# Patient Record
Sex: FEMALE | Race: WHITE | NOT HISPANIC OR LATINO | Employment: UNEMPLOYED | URBAN - NONMETROPOLITAN AREA
[De-identification: names, ages, dates, MRNs, and addresses within clinical notes are randomized per-mention and may not be internally consistent; named-entity substitution may affect disease eponyms.]

---

## 2023-12-14 ENCOUNTER — OFFICE VISIT (OUTPATIENT)
Dept: URGENT CARE | Facility: CLINIC | Age: 28
End: 2023-12-14
Payer: COMMERCIAL

## 2023-12-14 VITALS
RESPIRATION RATE: 19 BRPM | HEART RATE: 146 BPM | OXYGEN SATURATION: 97 % | SYSTOLIC BLOOD PRESSURE: 126 MMHG | DIASTOLIC BLOOD PRESSURE: 64 MMHG | TEMPERATURE: 98.6 F

## 2023-12-14 DIAGNOSIS — Z3A.26 26 WEEKS GESTATION OF PREGNANCY: ICD-10-CM

## 2023-12-14 DIAGNOSIS — R05.1 ACUTE COUGH: Primary | ICD-10-CM

## 2023-12-14 DIAGNOSIS — R09.81 NASAL CONGESTION: ICD-10-CM

## 2023-12-14 PROCEDURE — 99203 OFFICE O/P NEW LOW 30 MIN: CPT

## 2023-12-14 PROCEDURE — 87636 SARSCOV2 & INF A&B AMP PRB: CPT

## 2023-12-14 RX ORDER — RIBOFLAVIN (VITAMIN B2) 400 MG
400 TABLET ORAL DAILY
COMMUNITY
Start: 2023-10-24

## 2023-12-14 RX ORDER — FAMOTIDINE 20 MG/1
20 TABLET, FILM COATED ORAL 2 TIMES DAILY
COMMUNITY
Start: 2023-10-12 | End: 2024-10-11

## 2023-12-14 RX ORDER — LANOLIN ALCOHOL/MO/W.PET/CERES
400 CREAM (GRAM) TOPICAL DAILY
COMMUNITY
Start: 2023-10-24 | End: 2024-10-23

## 2023-12-14 RX ORDER — METFORMIN HYDROCHLORIDE 500 MG/1
2000 TABLET, EXTENDED RELEASE ORAL
COMMUNITY
Start: 2023-03-08 | End: 2024-03-07

## 2023-12-14 RX ORDER — LEVOTHYROXINE SODIUM 0.15 MG/1
150 TABLET ORAL DAILY
COMMUNITY

## 2023-12-15 LAB
FLUAV RNA RESP QL NAA+PROBE: NEGATIVE
FLUBV RNA RESP QL NAA+PROBE: NEGATIVE
SARS-COV-2 RNA RESP QL NAA+PROBE: NEGATIVE

## 2023-12-15 NOTE — PROGRESS NOTES
[facility]        NAME: Sid Harding is a 33 y.o. female  : 1995    MRN: [id number]  DATE: 2023  TIME: 8:46 AM    Assessment and Plan   Acute cough [R05.1]  1. Acute cough  Covid/Flu-Office Collect      2. Nasal congestion        3. 26 weeks gestation of pregnancy          26 weeks pregnant-having congestion, cough. PT is is unsure of over-the-counter medications to take during pregnancy. Given instructions on at home remedies and medications. Advised to follow-up with family doctor if no improvement. Advised to go to the ER if any symptoms worsen. COVID/flu PCR swab sent for testing. Patient Instructions     Tylenol over-the-counter for pain or fever. Avoid ibuprofen in pregnancy. Nasal saline rinses and Flonase over-the-counter twice daily for congestion. Avoid any decongestants over-the-counter. May take over-the-counter Benadryl or Zyrtec for congestion. Follow-up with family doctor/OB/GYN if no improvement. Go to the ER if any symptoms worsen. COVID/flu swab sent for PCR testing, may take 2 to 3 days for results. Follow up with PCP in 3-5 days. Proceed to  ER if symptoms worsen. Chief Complaint     Chief Complaint   Patient presents with    Nasal Congestion    Cough     Started today  No OTC medication  26 weeks pregnant           History of Present Illness       50-year-old female who is currently 26 weeks pregnant presents to the clinic for cough, nasal congestion. Son has been sick with similar symptoms at home. Denies any over-the-counter medication and denies any COVID/flu testing. She denies any fever, earache, sore throat, chest pain, shortness of breath, nausea, vomiting, diarrhea, abdominal pain, vaginal bleeding. Review of Systems   Review of Systems   Constitutional:  Negative for chills and fever. HENT:  Positive for congestion and rhinorrhea. Negative for ear discharge, ear pain and sore throat. Eyes: Negative. The ECG revealed sinus bradycardia.  Respiratory:  Positive for cough. Negative for shortness of breath and wheezing. Cardiovascular: Negative. Gastrointestinal: Negative. Genitourinary: Negative. Musculoskeletal: Negative. Skin: Negative. Neurological: Negative. Current Medications       Current Outpatient Medications:     Aspirin 81 MG CAPS, Take 162 mg by mouth daily, Disp: , Rfl:     famotidine (PEPCID) 20 mg tablet, Take 20 mg by mouth 2 (two) times a day, Disp: , Rfl:     Ferrous Sulfate (IRON PO), Take 1 tablet by mouth daily, Disp: , Rfl:     levothyroxine 150 mcg tablet, Take 150 mcg by mouth daily, Disp: , Rfl:     magnesium Oxide (MagOx 400) 400 mg TABS, Take 400 mg by mouth daily, Disp: , Rfl:     metFORMIN (GLUCOPHAGE-XR) 500 mg 24 hr tablet, Take 2,000 mg by mouth, Disp: , Rfl:     Riboflavin 400 MG TABS, Take 400 mg by mouth daily, Disp: , Rfl:     sertraline (ZOLOFT) 50 mg tablet, Take 50 mg by mouth daily, Disp: , Rfl:     Current Allergies     Allergies as of 2023 - Reviewed 2023   Allergen Reaction Noted    Propofol Swelling 2017    Penicillins Rash 2017            The following portions of the patient's history were reviewed and updated as appropriate: allergies, current medications, past family history, past medical history, past social history, past surgical history and problem list.     Past Medical History:   Diagnosis Date    Depression     GERD (gastroesophageal reflux disease)     Hypothyroidism     PCOS (polycystic ovarian syndrome)        Past Surgical History:   Procedure Laterality Date    ADENOIDECTOMY       SECTION      CHOLECYSTECTOMY      TONSILLECTOMY         No family history on file. Medications have been verified. Objective   /64   Pulse (!) 146   Temp 98.6 °F (37 °C)   Resp 19   SpO2 97%        Physical Exam     Physical Exam  Constitutional:       General: She is not in acute distress. Appearance: Normal appearance.  She is not ill-appearing or diaphoretic. HENT:      Head: Normocephalic and atraumatic. Right Ear: Tympanic membrane, ear canal and external ear normal.      Left Ear: Tympanic membrane, ear canal and external ear normal.      Nose: Congestion and rhinorrhea present. Mouth/Throat:      Mouth: Mucous membranes are moist.      Pharynx: Oropharynx is clear. No oropharyngeal exudate or posterior oropharyngeal erythema. Eyes:      Extraocular Movements: Extraocular movements intact. Conjunctiva/sclera: Conjunctivae normal.      Pupils: Pupils are equal, round, and reactive to light. Cardiovascular:      Rate and Rhythm: Normal rate and regular rhythm. Pulses: Normal pulses. Heart sounds: Normal heart sounds. Pulmonary:      Effort: Pulmonary effort is normal. No respiratory distress. Breath sounds: Normal breath sounds. Abdominal:      Tenderness: There is no abdominal tenderness. There is no guarding or rebound. Lymphadenopathy:      Cervical: No cervical adenopathy. Skin:     General: Skin is warm and dry. Capillary Refill: Capillary refill takes less than 2 seconds. Findings: No rash. Neurological:      General: No focal deficit present. Mental Status: She is alert and oriented to person, place, and time. Mental status is at baseline.    Psychiatric:         Mood and Affect: Mood normal.         Behavior: Behavior normal.

## 2023-12-15 NOTE — PATIENT INSTRUCTIONS
Tylenol over-the-counter for pain or fever. Avoid ibuprofen in pregnancy. Nasal saline rinses and Flonase over-the-counter twice daily for congestion. Avoid any decongestants over-the-counter. May take over-the-counter Benadryl or Zyrtec for congestion. Follow-up with family doctor/OB/GYN if no improvement. Go to the ER if any symptoms worsen. COVID/flu swab sent for PCR testing, may take 2 to 3 days for results. Follow up with PCP in 3-5 days. Proceed to  ER if symptoms worsen. Pregnancy   WHAT YOU NEED TO KNOW:   A normal pregnancy lasts about 40 weeks. The first trimester lasts from your last period through the 12th week of pregnancy. The second trimester lasts from the 13th week through the 23rd week. The third trimester lasts from the 24th week until your baby is born. If you know the date of your last period, your healthcare provider can estimate your due date. You may give birth to your baby any time from 37 weeks to 2 weeks after your due date. DISCHARGE INSTRUCTIONS:   Return to the emergency department if:   You develop a severe headache that does not go away. You have new or increased vision changes, such as blurred or spotted vision. You have new or increased swelling in your face or hands. You have pain or cramping in your abdomen or low back. You have vaginal bleeding. Call your doctor or obstetrician if:   You have abdominal cramps, pressure, or tightening. You have a change in vaginal discharge. You cannot keep food or drinks down, and you are losing weight. You have chills or a fever. You have vaginal itching, burning, or pain. You have yellow, green, white, or foul-smelling vaginal discharge. You have pain or burning when you urinate, less urine than usual, or pink or bloody urine. You have questions or concerns about your condition or care. Medicines:   Prenatal vitamins  provide some of the extra vitamins and minerals you need during pregnancy. Prenatal vitamins may also help to decrease the risk for certain birth defects. Take your medicine as directed. Contact your healthcare provider if you think your medicine is not helping or if you have side effects. Tell your provider if you are allergic to any medicine. Keep a list of the medicines, vitamins, and herbs you take. Include the amounts, and when and why you take them. Bring the list or the pill bottles to follow-up visits. Carry your medicine list with you in case of an emergency. Prenatal care:  Prenatal care is a series of visits with your healthcare provider throughout your pregnancy. Prenatal care can help prevent problems during pregnancy and childbirth. At each prenatal visit, your provider will weigh you and check your blood pressure. He or she will also check your baby's heartbeat and growth. You may also need the following at some visits:  A pelvic exam  allows your healthcare provider to see your cervix (the bottom part of your uterus). Your healthcare provider will use a speculum to open your vagina. He or she will check the size and shape of your uterus. At your first prenatal visit, you may also have a Pap smear. This is a test to check your cervix for abnormal cells. Blood tests  may be done to check for any of the following:     Gestational diabetes or anemia (low iron level)    Blood type or Rh factor, or certain birth defects    Immunity to certain diseases, such as chickenpox or rubella    An infection, such as a sexually transmitted infection, HIV, or hepatitis B    Hepatitis B  may need to be prevented or treated. Hepatitis B is inflammation of the liver caused by the hepatitis B virus (HBV). HBV can spread from a mother to her baby during delivery. You will be checked for HBV as early as possible in the first trimester of each pregnancy. You need the test even if you received the hepatitis B vaccine or were tested before.  You may need to have an HBV infection treated before you give birth. Urine tests  may also be done to check for sugar and protein. These can be signs of gestational diabetes or preeclampsia. Urine tests may also be done to check for signs of infection. A gestational diabetes screen  may be done. Your healthcare provider may order either a 1-step or 2-step oral glucose tolerance test (OGTT). 1-step OGTT:  Your blood sugar level will be tested after you have not eaten for 8 hours (fasting). You will then be given a glucose drink. Your level will be tested again 1 hour and 2 hours after you finish the drink. 2-step OGTT:  You do not have to fast for the first part of the test. You will have the glucose drink at any time of day. Your blood sugar level will be checked 1 hour later. If your blood sugar is higher than a certain level, another test will be ordered. You will fast and your blood sugar level will be tested. You will have the glucose drink. Your blood will be tested again 1 hour, 2 hours, and 3 hours after you finish the glucose drink. A fetal ultrasound  shows pictures of your baby inside your uterus. The pictures are used to check your baby's development, movement, and position. Genetic disorder screening tests  may be offered to you. These tests check your baby's risk for genetic disorders such as Down syndrome. A screening test may include blood tests and an ultrasound. Blood tests may be used to check your DNA or your partner's DNA. Genetic tests are not always accurate or complete. Your baby may be born with a genetic disorder that did not show up in the tests. Talk to your healthcare provider about any concerns you have with genetic testing. Body changes that may occur during your pregnancy:   Breast changes  you will experience include tenderness and tingling during the early part of your pregnancy. Your breasts will become larger. You may need to use a support bra.  You may see a thin, yellow fluid, called colostrum, leak from your nipples during the second trimester. Colostrum is a liquid that changes to milk about 3 days after you give birth. Skin changes and stretch marks  may occur during your pregnancy. You may have red marks, called stretch marks, on your skin. Stretch marks will usually fade after pregnancy. Use lotion if your skin is dry and itchy. The skin on your face, around your nipples, and below your belly button may darken. Most of the time, your skin will return to its normal color after your baby is born. Morning sickness  is nausea and vomiting that can happen at any time of day. Avoid fatty and spicy foods. Eat small meals throughout the day instead of large meals. Angelica may help to decrease nausea. Ask your healthcare provider about other ways of decreasing nausea and vomiting. Heartburn  may be caused by changes in your hormones during pregnancy. Your growing uterus may also push your stomach upward and force stomach acid to back up into your esophagus. Eat 4 or 5 small meals each day instead of large meals. Avoid spicy foods. Avoid eating right before bedtime. Constipation  may develop during your pregnancy. To treat constipation, eat foods high in fiber such as fiber cereals, beans, fruits, vegetables, whole-grain breads, and prune juice. Get regular exercise and drink plenty of water. Your healthcare provider may also suggest a fiber supplement to soften your bowel movements. Talk to your healthcare provider before you use any medicines to decrease constipation. Hemorrhoids  are enlarged veins in the rectal area. They may cause pain, itching, and bright red bleeding from your rectum. To decrease your risk for hemorrhoids, prevent constipation and do not strain to have a bowel movement. If you have hemorrhoids, soak in a tub of warm water to ease discomfort. Ask your healthcare provider how you can treat hemorrhoids.     Leg cramps and swelling  may be caused by low calcium levels or the added weight of pregnancy. Raise your legs above the level of your heart to decrease swelling. During a leg cramp, stretch or massage the muscle that has the cramp. Heat may help decrease pain and muscle spasms. Apply heat on your muscle for 20 to 30 minutes every 2 hours for as many days as directed. Back pain  may occur as your baby grows. Do not stand for long periods of time or lift heavy items. Use good posture while you stand, squat, or bend. Wear low-heeled shoes with good support. Rest may also help to relieve back pain. Ask your healthcare provider about exercises you can do to strengthen your back muscles. Stay healthy during your pregnancy:       Eat a variety of healthy foods. Healthy foods include fruits, vegetables, whole-grain breads, low-fat dairy foods, beans, lean meats, and fish. Drink liquids as directed. Ask how much liquid to drink each day and which liquids are best for you. Limit caffeine to less than 200 milligrams each day. Limit your intake of fish to 2 servings each week. Choose fish low in mercury such as canned light tuna, shrimp, crab, salmon, cod, or tilapia. Do not  eat fish high in mercury such as swordfish, tilefish, carlos mackerel, and shark. Take prenatal vitamins as directed. Your need for certain vitamins and minerals, such as folic acid, increases during pregnancy. Prenatal vitamins provide some of the extra vitamins and minerals you need. Prenatal vitamins may also help to decrease the risk for certain birth defects. Ask how much weight you should gain during your pregnancy. Too much or too little weight gain can be unhealthy for you and your baby. Talk to your healthcare provider about exercise. Moderate exercise can help you stay fit. Your healthcare provider will help you plan an exercise program that is safe for you during pregnancy. Do not smoke.   Smoking increases your risk for a miscarriage and heart and blood vessel problems. Smoking can cause your baby to be born too early or weigh less at birth. Quit smoking as soon as you think you might be pregnant. Ask your healthcare provider for information if you need help quitting. Do not drink alcohol. Alcohol passes from your body to your baby through the placenta. It can affect your baby's brain development and cause fetal alcohol syndrome (FAS). FAS is a group of conditions that causes mental, behavior, and growth problems. Talk to your healthcare provider before you take any medicines. Many medicines may harm your baby if you take them when you are pregnant. Do not take any medicines, vitamins, herbs, or supplements without first talking to your healthcare provider. Never use illegal or street drugs (such as marijuana or cocaine) while you are pregnant. Safety tips:   Avoid hot tubs and saunas. Do not use a hot tub or sauna while you are pregnant, especially during your first trimester. Hot tubs and saunas may raise your baby's temperature and increase the risk for birth defects. Avoid toxoplasmosis. This is an infection caused by eating raw meat or being around infected cat feces. It can cause birth defects, miscarriages, and other problems. Wash your hands after you touch raw meat. Make sure any meat is well-cooked before you eat it. Avoid raw eggs and unpasteurized milk. Use gloves or ask someone else to clean your cat's litter box while you are pregnant. Ask your healthcare provider about travel. The most comfortable time to travel is during the second trimester. Ask your healthcare provider if you can travel after 36 weeks. You may not be able to travel in an airplane after 36 weeks. He or she may also recommend that you avoid long road trips. Follow up with your doctor or obstetrician as directed:  Go to all of your prenatal visits during your pregnancy. Write down your questions so you remember to ask them during your visits.   © Copyright [employer] 2023 Information is for End User's use only and may not be sold, redistributed or otherwise used for commercial purposes. The above information is an  only. It is not intended as medical advice for individual conditions or treatments. Talk to your doctor, nurse or pharmacist before following any medical regimen to see if it is safe and effective for you. Viral Syndrome   WHAT YOU NEED TO KNOW:   Viral syndrome is a term used for symptoms of an infection caused by a virus. Viruses are spread easily from person to person on shared items. DISCHARGE INSTRUCTIONS:   Call your local emergency number (911 in the ), or have someone call if:   You have a seizure. You cannot be woken. You have chest pain or trouble breathing. Return to the emergency department if:   You have a stiff neck, a bad headache, and sensitivity to light. You feel weak, dizzy, or confused. You stop urinating or urinate a lot less than usual.    You cough up blood or thick yellow or green mucus. You have severe abdominal pain or your abdomen is larger than usual.    Call your doctor if:   Your symptoms do not get better with treatment or get worse after 3 days. You have a rash or ear pain. You have burning when you urinate. You have questions or concerns about your condition or care. Medicines: You may  need any of the following:  Acetaminophen  decreases pain and fever. It is available without a doctor's order. Ask how much to take and how often to take it. Follow directions. Read the labels of all other medicines you are using to see if they also contain acetaminophen, or ask your doctor or pharmacist. Acetaminophen can cause liver damage if not taken correctly. NSAIDs , such as ibuprofen, help decrease swelling, pain, and fever. NSAIDs can cause stomach bleeding or kidney problems in certain people. If you take blood thinner medicine, always ask your healthcare provider if NSAIDs are safe for you. Always read the medicine label and follow directions. Cold medicine  helps decrease swelling, control a cough, and relieve chest or nasal congestion. Saline nasal spray  helps decrease nasal congestion. Take your medicine as directed. Contact your healthcare provider if you think your medicine is not helping or if you have side effects. Tell your provider if you are allergic to any medicine. Keep a list of the medicines, vitamins, and herbs you take. Include the amounts, and when and why you take them. Bring the list or the pill bottles to follow-up visits. Carry your medicine list with you in case of an emergency. Manage your symptoms:   Drink liquids as directed to prevent dehydration. Ask how much liquid to drink each day and which liquids are best for you. Do not drink liquids with caffeine. Caffeine can make dehydration worse. Get plenty of rest to help your body heal.  Take naps throughout the day. Ask your healthcare provider when you can return to work and your normal activities. Use a cool mist humidifier  to increase air moisture in your home. This may make it easier for you to breathe and help decrease your cough. Drink tea with honey or use cough drops for a sore throat. Cough drops are available without a doctor's order. Follow directions for taking cough drops. Do not smoke or be close to anyone who is smoking. Nicotine and other chemicals in cigarettes and cigars can cause lung damage. Smoking can also delay healing. Ask your healthcare provider for information if you currently smoke and need help to quit. E-cigarettes or smokeless tobacco still contain nicotine. Talk to your healthcare provider before you use these products. Prevent the spread of germs:   Wash your hands often throughout the day. Use soap and water. Rub your soapy hands together, lacing your fingers, for at least 20 seconds. Rinse with warm, running water.  Dry your hands with a clean towel or paper towel. Use hand  that contains alcohol if soap and water are not available. Teach children how to wash their hands and use hand . Cover sneezes and coughs. Turn your face away and cover your mouth and nose with a tissue. Throw the tissue away. Use the bend of your arm if a tissue is not available. Then wash your hands well with soap and water or use hand . Teach children how to cover a cough or sneeze. Stay home while you are sick. Avoid crowds as much as possible. Get the influenza (flu) vaccine as soon as recommended each year. The flu vaccine is available starting in September or October. Ask your healthcare provider about the pneumonia vaccine. This vaccine is usually recommended every 5 years in older adults. Follow up with your doctor as directed:  Write down your questions so you remember to ask them during your visits. © Copyright [facility] 2023 Information is for End User's use only and may not be sold, redistributed or otherwise used for commercial purposes. The above information is an  only. It is not intended as medical advice for individual conditions or treatments. Talk to your doctor, nurse or pharmacist before following any medical regimen to see if it is safe and effective for you. Acute Cough   WHAT YOU NEED TO KNOW:   An acute cough can last up to 3 weeks. Common causes of an acute cough include a cold, allergies, or a lung infection. DISCHARGE INSTRUCTIONS:   Return to the emergency department if:   You have trouble breathing or feel short of breath. You cough up blood, or you see blood in your mucus. You faint or feel weak or dizzy. You have chest pain when you cough or take a deep breath. You have new wheezing. Contact your healthcare provider if:   You have a fever. Your cough lasts longer than 4 weeks. Your symptoms do not improve with treatment.     You have questions or concerns about your condition or care.    Medicines:   Medicines  may be needed to stop the cough, decrease swelling in your airways, or help open your airways. Medicine may also be given to help you cough up mucus. Ask your healthcare provider what over-the-counter medicines you can take. If you have an infection caused by bacteria, you may need antibiotics. Take your medicine as directed. Contact your healthcare provider if you think your medicine is not helping or if you have side effects. Tell your provider if you are allergic to any medicine. Keep a list of the medicines, vitamins, and herbs you take. Include the amounts, and when and why you take them. Bring the list or the pill bottles to follow-up visits. Carry your medicine list with you in case of an emergency. Manage your symptoms:   Do not smoke and stay away from others who smoke. Nicotine and other chemicals in cigarettes and cigars can cause lung damage and make your cough worse. Ask your healthcare provider for information if you currently smoke and need help to quit. E-cigarettes or smokeless tobacco still contain nicotine. Talk to your healthcare provider before you use these products. Drink extra liquids as directed. Liquids will help thin and loosen mucus so you can cough it up. Liquids will also help prevent dehydration. Examples of good liquids to drink include water, fruit juice, and broth. Do not drink liquids that contain caffeine. Caffeine can increase your risk for dehydration. Ask your healthcare provider how much liquid to drink each day. Rest as directed. Do not do activities that make your cough worse, such as exercise. Use a humidifier or vaporizer. Use a cool mist humidifier or a vaporizer to increase air moisture in your home. This may make it easier for you to breathe and help decrease your cough. Eat 2 to 5 mL of honey 2 times each day. Honey can help thin mucus and decrease your cough. Use cough drops or lozenges.   These can help decrease throat irritation and your cough. Follow up with your healthcare provider as directed:  Write down your questions so you remember to ask them during your visits. © Copyright [employer] 2023 Information is for End User's use only and may not be sold, redistributed or otherwise used for commercial purposes. The above information is an  only. It is not intended as medical advice for individual conditions or treatments. Talk to your doctor, nurse or pharmacist before following any medical regimen to see if it is safe and effective for you.

## 2023-12-16 ENCOUNTER — TELEPHONE (OUTPATIENT)
Dept: URGENT CARE | Facility: CLINIC | Age: 28
End: 2023-12-16